# Patient Record
Sex: MALE | Race: OTHER | NOT HISPANIC OR LATINO | URBAN - METROPOLITAN AREA
[De-identification: names, ages, dates, MRNs, and addresses within clinical notes are randomized per-mention and may not be internally consistent; named-entity substitution may affect disease eponyms.]

---

## 2019-08-05 ENCOUNTER — EMERGENCY (EMERGENCY)
Facility: HOSPITAL | Age: 24
LOS: 1 days | Discharge: HOME | End: 2019-08-05
Attending: EMERGENCY MEDICINE
Payer: SELF-PAY

## 2019-08-05 VITALS
TEMPERATURE: 100 F | SYSTOLIC BLOOD PRESSURE: 117 MMHG | WEIGHT: 115.08 LBS | HEART RATE: 94 BPM | RESPIRATION RATE: 20 BRPM | OXYGEN SATURATION: 94 % | HEIGHT: 60 IN | DIASTOLIC BLOOD PRESSURE: 74 MMHG

## 2019-08-05 PROCEDURE — 99284 EMERGENCY DEPT VISIT MOD MDM: CPT

## 2019-08-05 PROCEDURE — 99053 MED SERV 10PM-8AM 24 HR FAC: CPT

## 2019-08-06 VITALS
HEART RATE: 85 BPM | SYSTOLIC BLOOD PRESSURE: 116 MMHG | TEMPERATURE: 98 F | DIASTOLIC BLOOD PRESSURE: 73 MMHG | RESPIRATION RATE: 16 BRPM | OXYGEN SATURATION: 97 %

## 2019-08-06 PROCEDURE — 94640 AIRWAY INHALATION TREATMENT: CPT

## 2019-08-06 PROCEDURE — 71046 X-RAY EXAM CHEST 2 VIEWS: CPT

## 2019-08-06 PROCEDURE — 71046 X-RAY EXAM CHEST 2 VIEWS: CPT | Mod: 26

## 2019-08-06 PROCEDURE — 99285 EMERGENCY DEPT VISIT HI MDM: CPT | Mod: 25

## 2019-08-06 RX ORDER — IPRATROPIUM/ALBUTEROL SULFATE 18-103MCG
3 AEROSOL WITH ADAPTER (GRAM) INHALATION ONCE
Refills: 0 | Status: COMPLETED | OUTPATIENT
Start: 2019-08-06 | End: 2019-08-06

## 2019-08-06 RX ADMIN — Medication 3 MILLILITER(S): at 02:54

## 2019-08-06 RX ADMIN — Medication 3 MILLILITER(S): at 02:32

## 2019-08-06 RX ADMIN — Medication 3 MILLILITER(S): at 03:36

## 2019-08-06 RX ADMIN — Medication 40 MILLIGRAM(S): at 02:32

## 2019-08-06 NOTE — ED PROVIDER NOTE - CLINICAL SUMMARY MEDICAL DECISION MAKING FREE TEXT BOX
22 y/o M who presents with wheezing and SOB a few hours PTA, unrelieved with rescue inhaler. Pt with diffuse wheeze however, no signs of respiratory failure. Will traet with duonebx3, prednisone 40, and reassess 22 y/o M who presents with wheezing and SOB a few hours PTA, unrelieved with rescue inhaler. Pt with diffuse wheeze however, no signs of respiratory failure. Will traet with duonebx3, prednisone 40, and reassess  **ATTENDING MEDICAL DECISION MAKING/SYNTHESIS (Dr. Terrell Russell): I have reviewed the Chief Complaint, the HPI, the ROS, and have directly performed and confirmed the findings on the Physical Examination. I have reviewed the medical decision making with all providers, as applicable. The PROBLEM REPRESENTATION at this time is: 23-year-old man without formally-diagnosed asthma (but with prescribed MDI few months ago, without history of ICU or ED admissions for wheezing) now presenting with acute wheezing, cough, and shortness of breath over the past few hours (progressively worsening). The MOST LIKELY DIAGNOSIS, and the LIST OF DIFFERENTIAL DIAGNOSES, includes (but is not limited to) the following: viral syndrome e.g. bronchitis from hMPV or equivalent (causing wheezing, likely), reactive airways from inflammation/irritant exposure (possible), serious bacterial infection or sepsis/severe sepsis e.g. pneumonia. empyema, or equivalent, acute coronary syndrome (NO evidence), pulmonary embolism/deep venous thrombosis (NO evidence), pneumothorax (NO evidence), acute coronary syndrome or other cardiac etiology e.g. congestive heart failure, tamponade or equivalent (NO evidence), pleural effusion, pleurisy, electrolyte-metabolic-endocrine derangements, dehydration. The likelihood of each of these diagnoses has been appropriately considered in the context of this patient's presentation and my evaluation. PLAN: as described in EMR, including diagnostics, therapeutics and consultation as clinically warranted. I will continue to reevaluate the patient, including the results of all testing, and monitor response to therapy throughout the patient's course in the ED.

## 2019-08-06 NOTE — ED PROVIDER NOTE - RECENT EXPOSURE TO
**ATTENDING ADDENDUM (Dr. Terrell Russell): DENIES recent travel. NO sick contacts. NO history of trauma./none known

## 2019-08-06 NOTE — ED PROVIDER NOTE - PROGRESS NOTE DETAILS
**ATTENDING ADDENDUM (Dr. Terrell Russell): patient serially evaluated throughout ED course. NO acute deterioration up to this time in the ED. Feels improved following therapeutics. Will continue to observe and monitor closely. Anticipatory guidance provided. **ATTENDING ADDENDUM (Dr. Terrell Russell): patient serially evaluated throughout ED course. NO acute deterioration up to this time in the ED. Patient subjectively improved. Peak flow 150. Awaiting CXR. Will continue to observe and monitor closely. peak flow 230 Resident Yrn Brown PGY1: Pt reports improvement of SOB after duoneb treatments and prednisone 40. Lungs with improved breath sounds with expiratory wheeze. Will continue to reasesss and obtain another peak flow      **ATTENDING ADDENDUM (Dr. Terrell Russell): patient serially evaluated throughout ED course. NO acute deterioration up to this time in the ED. Patient subjectively improved. Peak flow 150. Awaiting CXR. Will continue to observe and monitor closely. peak flow 230. pt stable for dc home Resident Yrn Brown PGY1: Pt reports improvement of SOB after duoneb treatments and prednisone 40. Lungs with improved breath sounds with expiratory wheeze. Will continue to reasesss and obtain another peak flow  **ATTENDING ADDENDUM (Dr. Terrell Russell): patient serially evaluated throughout ED course. NO acute deterioration up to this time in the ED. Patient subjectively improved. Peak flow 150. Awaiting CXR. Will continue to observe and monitor closely. peak flow 230. pt stable for dc home  **ATTENDING ADDENDUM (Dr. Terrell Russell): patient serially evaluated throughout ED course. NO acute deterioration up to this time in the ED. Agree with above notation by EM resident Stephanie. Repeat PEFR noted. Agree with clinical improvement. NO evidence of respiratory failure or need for inpatient/MICU admission at this time. Agree with discharge home with close outpatient followup with primary care physician/provider and with medications as prescribed.

## 2019-08-06 NOTE — ED PROVIDER NOTE - NSFOLLOWUPCLINICS_GEN_ALL_ED_FT
[facility]  Pulmonary Medicine  [address] [place] [address]  Phone: [phone]  Fax: [phone]  Follow Up Time:

## 2019-08-06 NOTE — ED PROVIDER NOTE - BREATH SOUNDS
WHEEZES/**ATTENDING ADDENDUM (Dr. Terrell Russell): decent air entry and movement noted. NO rales, rhonchi, crackles, stridor, drooling, severe retractions, nasal flaring, or tripoding.

## 2019-08-06 NOTE — ED PROVIDER NOTE - FAMILY DETAILS FREE TEXT FOR MDM ADDL HISTORY OBTAINED FROM QUESTION
**ATTENDING ADDENDUM (Dr. Terrell Russell): family member(s) present during patient's ED visit; corroborated CC, HPI and review of systems as provided by patient.

## 2019-08-06 NOTE — ED PROVIDER NOTE - NSFOLLOWUPINSTRUCTIONS_ED_ALL_ED_FT
FOLLOW UP WITH YOUR  DOCTOR WITHIN 2-3 DAYS  FOLLOW UP WITH A PULMONOLOGIST WITHIN 2-3 DAYS  TAKE THE PREDNISONE 40 MG (1 TAB) ONCE A DAY FOR THE NEXT 4 DAYS.  USE YOUR ALBUTEROL INHALER EVERY 6 HOURS FOR THE NEXT 2-3 DAYS  RETURN TO ED FOR NEW OR WORSENING SYMPTOMS.

## 2019-08-06 NOTE — ED ADULT NURSE NOTE - OBJECTIVE STATEMENT
23 YOM A&Ox3 with pmh of asthma presents to ED for SOB. Pt states this has happened before and requires nebulizers and steroids and then he feels better. upon assessment breathing labored, heart sounds within normal limits. pt denies chest pain, n/v/d, headaches & blurry vision. safety maintained.

## 2019-08-06 NOTE — ED PROVIDER NOTE - DIAGNOSTIC INTERPRETATION
**ATTENDING ADDENDUM (Dr. Terrell Russell): Radiographs reviewed. Pertinent findings include: NO obvious infiltrate or effusion.

## 2019-08-06 NOTE — ED PROVIDER NOTE - CARE PLAN
Principal Discharge DX:	Asthma Principal Discharge DX:	Wheezing  Goal:	**ATTENDING ADDENDUM (Dr. Terrell Russell): Goals of care include resolution of emergent/urgent symptoms and concerns, and restoration to baseline level of homeostasis.  Secondary Diagnosis:	Bronchitis

## 2019-08-06 NOTE — ED ADULT NURSE REASSESSMENT NOTE - NS ED NURSE REASSESS COMMENT FT1
pt appears to be in no acute distress. VSS. peak flow redone, pt 230 L/min. MD Brown made aware. pt awaiting chest x0ray results. safety maintained.

## 2019-08-06 NOTE — ED PROVIDER NOTE - PHYSICAL EXAMINATION
PHYSICAL EXAM:  GENERAL: non-toxic appearing; Sitting up in his bed, appears SOB  HEAD: Atraumatic, Normocephalic; no head's sign, no periorbital ecchymosis   EYES: PERRL, EOMs intact b/l w/out deficits  ENMT: Moist membranes, no anterior/posterior, or supraclavicular LAD  CHEST/LUNG: decreased breath sounds in all lung fields with diffuse wheezing; no accessory muscle use   HEART: RRR no murmur/gallops/rubs  ABDOMEN: +BS, soft, NT, ND  EXTREMITIES: No LE edema, +2 radial pulses b/l  MUSCULOSKELETAL: FROM of all 4 extremities;  NERVOUS SYSTEM:  A&Ox3, No motor deficits or sensory deficits to b/l UEs  Heme/LYMPH: No ecchymosis or bruising or LAD  SKIN:  No new rashes

## 2019-08-06 NOTE — ED ADULT NURSE NOTE - CHPI ED NUR SYMPTOMS NEG
no hemoptysis/no chills/no diaphoresis/no edema/no cough/no chest pain/no headache/no body aches/no fever

## 2019-08-06 NOTE — ED PROVIDER NOTE - EYES, MLM
**ATTENDING ADDENDUM (Dr. Terrell Russell): Extraocular muscle movements intact. Clear corneas bilaterally, pupils equal and round. NO scleral icterus bilaterally.

## 2019-08-06 NOTE — ED PROVIDER NOTE - GASTROINTESTINAL, MLM
Abdomen soft, non-tender **ATTENDING ADDENDUM (Dr. Terrell Russell): non-distended. NO guarding, rebound, or rigidity. NO pulsatile or non-pulsatile masses. NO hernias. NO obvious hepatosplenomegaly.

## 2019-08-06 NOTE — ED PROVIDER NOTE - NS ED ROS FT
Constitutional: no fevers or chills  HEENT: no visual changes, no sore throat, no rhinorrhea  CV: no cp or palpitations  Resp: sob, cough, wheeze  GI: no abd pain, n/v, diarrhea/constipation  : no dysuria, hematuria  MSK: no myalgais or arthralgias  skin: no rashes  neuro: no HA, no confusion  psych: no SI/HI  heme: no LAD

## 2019-08-06 NOTE — ED PROVIDER NOTE - OBJECTIVE STATEMENT
24 y/o M presents to the ED c/o SOB and wheezing a few hours PTA. Pt attempted rescue inhaler without relief. Pt states he was never formally diagnosed with asthma but was given albuterol pump about 6 months ago. Pt also c/o productive yellow cough. Pt denies fever, chills, n/v, CP, abd pain, headache. Pt has never taken steroids before or been intubated or hospitalized for asthma 24 y/o M presents to the ED c/o SOB and wheezing a few hours PTA. Pt attempted rescue inhaler without relief. Pt states he was never formally diagnosed with asthma but was given albuterol pump about 6 months ago. Pt also c/o productive yellow cough. Pt denies fever, chills, n/v, CP, abd pain, headache. Pt has never taken steroids before or been intubated or hospitalized for asthma  **ATTENDING ADDENDUM (Dr. Terrell Russell): I attest that I have directly and personally interviewed and examined this patient and elicited a comparable history of present illness and review of systems as documented, along with my EM resident. I attest that I have made significant contributions to the documentation where necessary and as noted in the EMR.

## 2019-08-06 NOTE — ED PROVIDER NOTE - PLAN OF CARE
**ATTENDING ADDENDUM (Dr. Terrell Russell): Goals of care include resolution of emergent/urgent symptoms and concerns, and restoration to baseline level of homeostasis.

## 2019-08-06 NOTE — ED PROVIDER NOTE - ATTENDING CONTRIBUTION TO CARE
**ATTENDING ADDENDUM (Dr. Terrell Russell): I attest that I have directly examined this patient and reviewed and formulated the diagnostic and therapeutic management plan in collaboration with the EM resident. Please see MDM note and remainder of EMR for findings from CC, HPI, ROS, and PE. (Stephanie)

## 2019-08-06 NOTE — ED PROVIDER NOTE - MUSCULOSKELETAL, MLM
Spine appears normal, range of motion is not limited, no muscle or joint tenderness **ATTENDING ADDENDUM (Dr. Terrell Russell): NO cords, soft-tissue swelling, or calf tenderness in the bilateral lower extremities.